# Patient Record
Sex: FEMALE | Race: WHITE | URBAN - METROPOLITAN AREA
[De-identification: names, ages, dates, MRNs, and addresses within clinical notes are randomized per-mention and may not be internally consistent; named-entity substitution may affect disease eponyms.]

---

## 2022-09-12 ENCOUNTER — OFFICE VISIT (OUTPATIENT)
Dept: URBAN - METROPOLITAN AREA CLINIC 75 | Facility: CLINIC | Age: 82
End: 2022-09-12
Payer: MEDICARE

## 2022-09-12 DIAGNOSIS — H52.4 PRESBYOPIA: ICD-10-CM

## 2022-09-12 DIAGNOSIS — H35.033 HYPERTENSIVE RETINOPATHY, BILATERAL: ICD-10-CM

## 2022-09-12 DIAGNOSIS — E11.3292: ICD-10-CM

## 2022-09-12 DIAGNOSIS — H02.889 MEIBOMIAN GLAND DYSFUNCTION OF EYE: ICD-10-CM

## 2022-09-12 DIAGNOSIS — H43.813 VITREOUS DEGENERATION, BILATERAL: ICD-10-CM

## 2022-09-12 DIAGNOSIS — E11.3311: Primary | ICD-10-CM

## 2022-09-12 DIAGNOSIS — H35.373 PUCKERING OF MACULA, BILATERAL: ICD-10-CM

## 2022-09-12 DIAGNOSIS — E11.3313 TYPE 2 DIABETES MELLITUS WITH MODERATE NONPROLIFERATIVE DIABETIC RETINOPATHY WITH MACULAR EDEMA, BILATERAL: ICD-10-CM

## 2022-09-12 DIAGNOSIS — H04.123 DRY EYE SYNDROME OF BILATERAL LACRIMAL GLANDS: ICD-10-CM

## 2022-09-12 PROCEDURE — 92015 DETERMINE REFRACTIVE STATE: CPT

## 2022-09-12 PROCEDURE — 99204 OFFICE O/P NEW MOD 45 MIN: CPT

## 2022-09-12 ASSESSMENT — VISUAL ACUITY
OD: 20/40
OS: 20/25

## 2022-09-12 ASSESSMENT — INTRAOCULAR PRESSURE
OD: 11
OS: 10

## 2022-09-12 NOTE — IMPRESSION/PLAN
Impression: Hypertensive retinopathy, bilateral: H35.033. Plan: Discussed diagnosis in detail with patient. Discussed treatment options with patient, though treatment is not required or recommended at this time. Will continue to observe condition and or symptoms. Reassured patient of current condition and treatment.

## 2022-09-12 NOTE — IMPRESSION/PLAN
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 Optos ordered and done today Plan: Diabetes type II: moderate background diabetic retinopathy, no signs of neovascularization noted. Will refer patient for a retinal consult to determine if treatment is necessary. Discussed ocular and systemic benefits of maintaining blood sugar control.

## 2022-09-12 NOTE — IMPRESSION/PLAN
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 Plan: Mild background diabetic retinopathy, no signs of neovascularization noted. No treatment necessary at this time. Patient was instructed to monitor vision for sudden changes and to call if visual changes noted. Discussed ocular and systemic benefits of blood sugar control.

## 2022-09-12 NOTE — IMPRESSION/PLAN
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 OCT and OPTOS ordered and done today Plan: Moderate background diabetic retinopathy, no signs of neovascularization noted. Will refer patient for a retinal consult to determine if treatment is necessary. Discussed ocular and systemic benefits of maintaining blood sugar control.

## 2022-09-12 NOTE — IMPRESSION/PLAN
Impression: Presbyopia: H52.4.  Plan: No glasses RX dispensed  due to findings, discussed with patient that glasses RX will not be reliable until treatment is complete with a retina specialist.

## 2022-09-12 NOTE — IMPRESSION/PLAN
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 Plan: Discussed diagnosis in detail with patient, along with treatment options. Patient recommended to apply warm compresses at least QD for minimum 10 minutes. Patient recommended to use artificial tears as needed for comfort. Will continue to observe condition and or symptoms.

## 2023-01-05 ENCOUNTER — OFFICE VISIT (OUTPATIENT)
Dept: URBAN - METROPOLITAN AREA CLINIC 71 | Facility: CLINIC | Age: 83
End: 2023-01-05
Payer: MEDICARE

## 2023-01-05 DIAGNOSIS — E11.3311 TYPE 2 DIABETES MELLITUS WITH MODERATE NONPROLIFERATIVE DIABETIC RETINOPATHY WITH MACULAR EDEMA, RIGHT EYE: ICD-10-CM

## 2023-01-05 DIAGNOSIS — Z96.1 PRESENCE OF INTRAOCULAR LENS: ICD-10-CM

## 2023-01-05 DIAGNOSIS — H34.8312 BRANCH RETINAL VEIN OCCLUSION, STABLE, RIGHT EYE: Primary | ICD-10-CM

## 2023-01-05 PROCEDURE — 99204 OFFICE O/P NEW MOD 45 MIN: CPT | Performed by: OPHTHALMOLOGY

## 2023-01-05 PROCEDURE — 92134 CPTRZ OPH DX IMG PST SGM RTA: CPT | Performed by: OPHTHALMOLOGY

## 2023-01-05 ASSESSMENT — INTRAOCULAR PRESSURE
OD: 17
OS: 21

## 2023-01-05 NOTE — IMPRESSION/PLAN
Impression: Branch retinal vein occlusion, stable, right eye: L00.1959. Plan: Along [place] arc[place], no CME, mild thinning of inner layer on macular OCT suggestive of ischemic component which would likely account for 20/40-2 Va. Check next available FA. No apparent NV noted today. Control BP.

## 2023-10-02 ENCOUNTER — OFFICE VISIT (OUTPATIENT)
Dept: URBAN - METROPOLITAN AREA CLINIC 76 | Facility: CLINIC | Age: 83
End: 2023-10-02

## 2023-10-02 DIAGNOSIS — H34.8312 TRIBUTARY (BRANCH) RETINAL VEIN OCCLUSION, RIGHT EYE, STABLE: Primary | ICD-10-CM

## 2023-10-02 PROCEDURE — 92250 FUNDUS PHOTOGRAPHY W/I&R: CPT | Performed by: OPHTHALMOLOGY

## 2023-10-02 PROCEDURE — 92235 FLUORESCEIN ANGRPH MLTIFRAME: CPT | Performed by: OPHTHALMOLOGY

## 2023-10-02 PROCEDURE — 67028 INJECTION EYE DRUG: CPT | Performed by: OPHTHALMOLOGY

## 2023-10-02 PROCEDURE — 99214 OFFICE O/P EST MOD 30 MIN: CPT | Performed by: OPHTHALMOLOGY

## 2023-10-02 PROCEDURE — 92134 CPTRZ OPH DX IMG PST SGM RTA: CPT | Performed by: OPHTHALMOLOGY

## 2023-10-02 ASSESSMENT — INTRAOCULAR PRESSURE
OD: 15
OS: 16

## 2023-10-30 ENCOUNTER — OFFICE VISIT (OUTPATIENT)
Dept: URBAN - METROPOLITAN AREA CLINIC 76 | Facility: CLINIC | Age: 83
End: 2023-10-30
Payer: MEDICARE

## 2023-10-30 DIAGNOSIS — H34.8312 TRIBUTARY (BRANCH) RETINAL VEIN OCCLUSION, RIGHT EYE, STABLE: Primary | ICD-10-CM

## 2023-10-30 PROCEDURE — 67028 INJECTION EYE DRUG: CPT | Performed by: OPHTHALMOLOGY

## 2023-10-30 PROCEDURE — 92134 CPTRZ OPH DX IMG PST SGM RTA: CPT | Performed by: OPHTHALMOLOGY

## 2023-10-30 ASSESSMENT — INTRAOCULAR PRESSURE
OS: 12
OD: 12

## 2023-11-27 ENCOUNTER — OFFICE VISIT (OUTPATIENT)
Dept: URBAN - METROPOLITAN AREA CLINIC 76 | Facility: CLINIC | Age: 83
End: 2023-11-27
Payer: MEDICARE

## 2023-11-27 DIAGNOSIS — H34.8312 TRIBUTARY (BRANCH) RETINAL VEIN OCCLUSION, RIGHT EYE, STABLE: Primary | ICD-10-CM

## 2023-11-27 PROCEDURE — 67028 INJECTION EYE DRUG: CPT | Performed by: OPHTHALMOLOGY

## 2023-11-27 ASSESSMENT — INTRAOCULAR PRESSURE
OD: 12
OS: 11

## 2024-01-04 ENCOUNTER — OFFICE VISIT (OUTPATIENT)
Dept: URBAN - METROPOLITAN AREA CLINIC 71 | Facility: CLINIC | Age: 84
End: 2024-01-04
Payer: MEDICARE

## 2024-01-04 PROCEDURE — 67028 INJECTION EYE DRUG: CPT | Performed by: OPHTHALMOLOGY

## 2024-01-04 ASSESSMENT — INTRAOCULAR PRESSURE
OS: 15
OD: 17

## 2024-03-07 ENCOUNTER — OFFICE VISIT (OUTPATIENT)
Dept: URBAN - METROPOLITAN AREA CLINIC 71 | Facility: CLINIC | Age: 84
End: 2024-03-07
Payer: MEDICARE

## 2024-03-07 DIAGNOSIS — H34.8312 TRIBUTARY (BRANCH) RETINAL VEIN OCCLUSION, RIGHT EYE, STABLE: Primary | ICD-10-CM

## 2024-03-07 PROCEDURE — 92134 CPTRZ OPH DX IMG PST SGM RTA: CPT | Performed by: OPHTHALMOLOGY

## 2024-03-07 PROCEDURE — 67028 INJECTION EYE DRUG: CPT | Performed by: OPHTHALMOLOGY

## 2024-04-15 ENCOUNTER — OFFICE VISIT (OUTPATIENT)
Dept: URBAN - METROPOLITAN AREA CLINIC 76 | Facility: CLINIC | Age: 84
End: 2024-04-15
Payer: MEDICARE

## 2024-04-15 DIAGNOSIS — H34.8312 TRIBUTARY (BRANCH) RETINAL VEIN OCCLUSION, RIGHT EYE, STABLE: Primary | ICD-10-CM

## 2024-04-15 PROCEDURE — 92134 CPTRZ OPH DX IMG PST SGM RTA: CPT | Performed by: OPHTHALMOLOGY

## 2024-04-15 PROCEDURE — 67028 INJECTION EYE DRUG: CPT | Performed by: OPHTHALMOLOGY

## 2024-04-15 ASSESSMENT — INTRAOCULAR PRESSURE
OD: 11
OS: 12

## 2024-06-10 ENCOUNTER — OFFICE VISIT (OUTPATIENT)
Dept: URBAN - METROPOLITAN AREA CLINIC 76 | Facility: CLINIC | Age: 84
End: 2024-06-10
Payer: MEDICARE

## 2024-06-10 DIAGNOSIS — H34.8312 TRIBUTARY (BRANCH) RETINAL VEIN OCCLUSION, RIGHT EYE, STABLE: Primary | ICD-10-CM

## 2024-06-10 PROCEDURE — 67028 INJECTION EYE DRUG: CPT | Performed by: OPHTHALMOLOGY

## 2024-06-10 PROCEDURE — 92134 CPTRZ OPH DX IMG PST SGM RTA: CPT | Performed by: OPHTHALMOLOGY

## 2024-06-10 ASSESSMENT — INTRAOCULAR PRESSURE
OS: 12
OD: 12

## 2024-11-14 ENCOUNTER — OFFICE VISIT (OUTPATIENT)
Dept: URBAN - METROPOLITAN AREA CLINIC 71 | Facility: CLINIC | Age: 84
End: 2024-11-14
Payer: MEDICARE

## 2024-11-14 DIAGNOSIS — H34.8312 TRIBUTARY (BRANCH) RETINAL VEIN OCCLUSION, RIGHT EYE, STABLE: Primary | ICD-10-CM

## 2024-11-14 PROCEDURE — 67028 INJECTION EYE DRUG: CPT | Performed by: OPHTHALMOLOGY

## 2024-11-14 PROCEDURE — 92134 CPTRZ OPH DX IMG PST SGM RTA: CPT | Performed by: OPHTHALMOLOGY

## 2024-11-14 ASSESSMENT — INTRAOCULAR PRESSURE
OD: 11
OS: 10

## 2025-06-12 ENCOUNTER — OFFICE VISIT (OUTPATIENT)
Dept: URBAN - METROPOLITAN AREA CLINIC 71 | Facility: CLINIC | Age: 85
End: 2025-06-12
Payer: MEDICARE

## 2025-06-12 DIAGNOSIS — H34.8312 TRIBUTARY (BRANCH) RETINAL VEIN OCCLUSION, RIGHT EYE, STABLE: Primary | ICD-10-CM

## 2025-06-12 PROCEDURE — 92134 CPTRZ OPH DX IMG PST SGM RTA: CPT | Performed by: OPHTHALMOLOGY

## 2025-06-12 PROCEDURE — 92014 COMPRE OPH EXAM EST PT 1/>: CPT | Performed by: OPHTHALMOLOGY

## 2025-06-12 ASSESSMENT — INTRAOCULAR PRESSURE
OS: 10
OD: 10